# Patient Record
Sex: MALE | Race: WHITE | NOT HISPANIC OR LATINO | Employment: OTHER | URBAN - METROPOLITAN AREA
[De-identification: names, ages, dates, MRNs, and addresses within clinical notes are randomized per-mention and may not be internally consistent; named-entity substitution may affect disease eponyms.]

---

## 2021-02-21 ENCOUNTER — APPOINTMENT (OUTPATIENT)
Dept: GENERAL RADIOLOGY | Facility: HOSPITAL | Age: 66
End: 2021-02-21

## 2021-02-21 ENCOUNTER — HOSPITAL ENCOUNTER (EMERGENCY)
Facility: HOSPITAL | Age: 66
Discharge: HOME | End: 2021-02-21
Attending: EMERGENCY MEDICINE | Admitting: EMERGENCY MEDICINE

## 2021-02-21 VITALS
HEART RATE: 60 BPM | RESPIRATION RATE: 16 BRPM | SYSTOLIC BLOOD PRESSURE: 152 MMHG | DIASTOLIC BLOOD PRESSURE: 83 MMHG | WEIGHT: 185 LBS | TEMPERATURE: 96.4 F | BODY MASS INDEX: 27.4 KG/M2 | OXYGEN SATURATION: 99 % | HEIGHT: 69 IN

## 2021-02-21 DIAGNOSIS — M79.605 LOW BACK PAIN RADIATING TO BOTH LEGS: Primary | ICD-10-CM

## 2021-02-21 DIAGNOSIS — M54.50 LOW BACK PAIN RADIATING TO BOTH LEGS: Primary | ICD-10-CM

## 2021-02-21 DIAGNOSIS — M79.604 LOW BACK PAIN RADIATING TO BOTH LEGS: Primary | ICD-10-CM

## 2021-02-21 PROCEDURE — 96372 THER/PROPH/DIAG INJ SC/IM: CPT

## 2021-02-21 PROCEDURE — 25010000002 KETOROLAC TROMETHAMINE PER 15 MG: Performed by: EMERGENCY MEDICINE

## 2021-02-21 PROCEDURE — 72110 X-RAY EXAM L-2 SPINE 4/>VWS: CPT

## 2021-02-21 PROCEDURE — 99282 EMERGENCY DEPT VISIT SF MDM: CPT

## 2021-02-21 PROCEDURE — 25010000002 HYDROMORPHONE 1 MG/ML SOLUTION: Performed by: EMERGENCY MEDICINE

## 2021-02-21 RX ORDER — METHYLPREDNISOLONE 4 MG/1
TABLET ORAL
Qty: 1 EACH | Refills: 0 | Status: SHIPPED | OUTPATIENT
Start: 2021-02-21

## 2021-02-21 RX ORDER — LISINOPRIL 10 MG/1
30 TABLET ORAL DAILY
COMMUNITY

## 2021-02-21 RX ORDER — KETOROLAC TROMETHAMINE 30 MG/ML
30 INJECTION, SOLUTION INTRAMUSCULAR; INTRAVENOUS ONCE
Status: COMPLETED | OUTPATIENT
Start: 2021-02-21 | End: 2021-02-21

## 2021-02-21 RX ORDER — ORPHENADRINE CITRATE 100 MG/1
100 TABLET, EXTENDED RELEASE ORAL 2 TIMES DAILY
Qty: 20 TABLET | Refills: 0 | Status: SHIPPED | OUTPATIENT
Start: 2021-02-21

## 2021-02-21 RX ADMIN — KETOROLAC TROMETHAMINE 30 MG: 30 INJECTION, SOLUTION INTRAMUSCULAR at 10:13

## 2021-02-21 RX ADMIN — HYDROMORPHONE HYDROCHLORIDE 1 MG: 1 INJECTION, SOLUTION INTRAMUSCULAR; INTRAVENOUS; SUBCUTANEOUS at 10:13

## 2021-02-21 NOTE — ED NOTES
Pt arrived by PV states bilat leg pain. Pt ambulatory to triage desk.     Patient was placed in face mask during first look triage.  Patient was wearing a face mask throughout encounter.  I wore personal protective equipment throughout the encounter.  Hand hygiene was performed before and after patient encounter.        Gretchen Oviedo, RN  02/21/21 0859

## 2021-02-21 NOTE — ED PROVIDER NOTES
EMERGENCY DEPARTMENT ENCOUNTER    Room Number:  03/03  Date seen:  2/21/2021  Time seen: 09:06 EST  PCP: Norbert Keating MD  Historian: patient       HPI:  Chief Complaint: bilateral leg pain    A complete HPI/ROS/PMH/PSH/SH/FH are unobtainable due to: none    Context: Papi Valderrama is a 65 y.o. male who presents to the ED for evaluation of bilateral lower back pain that radiates to the bilateral legs.  He states he chronically has left-sided sciatica and low back pain which he sees pain management for.  To 3 days ago he started having right sided low back pain that radiates to his right leg which feels the same as the left, but is new.  He denies any falls but states he did shovel snow several times this week.  He states it is much more painful when he tries to walk, he is comfortable at rest when sitting or lying.  He denies any lower extremity weakness or numbness, saddle paresthesia, bowel or bladder incontinence or urinary retention.  He also denies any history of IV drug abuse, malignancy or diabetes.  He denies any fever, hematuria dysuria or abdominal pain.  He is ambulatory but with increased pain.  He takes hydrocodone 7.5 mg 4 times daily as well as gabapentin 800 mg 3 times daily as prescribed to him by pain management.  He denies any history of kidney disease and is not anticoagulated.      PAST MEDICAL HISTORY  Active Ambulatory Problems     Diagnosis Date Noted   • No Active Ambulatory Problems     Resolved Ambulatory Problems     Diagnosis Date Noted   • No Resolved Ambulatory Problems     Past Medical History:   Diagnosis Date   • Hyperlipidemia    • Hypertension          PAST SURGICAL HISTORY  Past Surgical History:   Procedure Laterality Date   • BYPASS GRAFT     • CATARACT EXTRACTION     • COLON SURGERY     • COLONOSCOPY           FAMILY HISTORY  History reviewed. No pertinent family history.      SOCIAL HISTORY  Social History     Socioeconomic History   • Marital status: Single      Spouse name: Not on file   • Number of children: Not on file   • Years of education: Not on file   • Highest education level: Not on file   Tobacco Use   • Smoking status: Never Smoker   • Smokeless tobacco: Never Used   Substance and Sexual Activity   • Alcohol use: Yes     Comment: daily 1-2    • Drug use: Never   • Sexual activity: Defer         ALLERGIES  Patient has no known allergies.        REVIEW OF SYSTEMS  Review of Systems     All systems reviewed and negative except for those discussed in HPI.       PHYSICAL EXAM  ED Triage Vitals   Temp Heart Rate Resp BP SpO2   02/21/21 0903 02/21/21 0900 02/21/21 0900 -- 02/21/21 0900   96.4 °F (35.8 °C) 98 18  98 %      Temp src Heart Rate Source Patient Position BP Location FiO2 (%)   02/21/21 0903 02/21/21 0900 -- -- --   Tympanic Monitor            GENERAL: not distressed  HENT: atraumatic  EYES: no scleral icterus  CV: regular rhythm, regular rate  RESPIRATORY: normal effort, ctab  ABDOMEN: soft, nontender  MUSCULOSKELETAL: no deformity.  No midline C, T, L-spine tenderness.  There is no tenderness to the bilateral paraspinal muscles.  He is able to stand walk and change positions on the stretcher.  Straight leg raise is positive on the left, negative on the right. Sensation is intact to light touch throughout the bilateral lower extremities. Muscle strength is 5/5 and symmetrical with plantarflexion and EHL extension. Patellar reflexes are 2+ and equal bilaterally. DP and PT pulses are 2+ bilaterally.   NEURO: alert, moves all extremities, follows commands  SKIN: warm, dry    Vital signs and nursing notes reviewed.        RADIOLOGY  Xr Spine Lumbar Complete 4+vw    Result Date: 2/21/2021  Narrative: FIVE-VIEW LUMBAR SPINE  HISTORY: Low back pain extending into both legs.  There is mild to moderate disc space narrowing and some associated spurring at L5-S1 and to a lesser extent some minimal disc space narrowing and spurring in the remainder of the lumbar spine.  This is associated with some slight spurring of the posterior facets and minimal posterior subluxation at L3-4 measuring 3 or 4 mm.  This report was finalized on 2/21/2021 1:12 PM by Dr. Gagandeep Partida M.D.        I ordered the above noted radiological studies. Reviewed by me and discussed with radiologist.  See dictation for official radiology interpretation.    PROCEDURES  Procedures        MEDICATIONS GIVEN IN ER  Medications   HYDROmorphone (DILAUDID) injection 1 mg (1 mg Intramuscular Given 2/21/21 1013)   ketorolac (TORADOL) injection 30 mg (30 mg Intramuscular Given 2/21/21 1013)             PROGRESS AND CONSULTS    DDX includes but not limited to lumbosacral strain, radiculopathy, sciatica, DJD, spinal stenosis, herniated disc, epidural hematoma, epidural abscess, cauda equina syndrome    ED Course as of Feb 21 1707   Sun Feb 21, 2021   1112 This is the patient, he feels improved.  He ambulated independently with an antalgic gait.  States he has a cane at home that he can use if needed though he has not felt compelled to at this point.  He is already on hydrocodone and gabapentin at home for pain management.  I have encouraged him to call his pain management doctor if he feels he needs any adjustments in those.  For now I will prescribe him a muscle relaxer and a Medrol Dosepak and he can also follow-up with his PCP.  I counseled him on indications for return including leg weakness bowel or bladder dysfunction or saddle paresthesias.  He is agreeable with the plan is stable for discharge.    He has no fever and no IV drug abuse, nothing to suggest epidural abscess, not anticoagulated and no recent procedures on his back to suggest an epidural hematoma.  He has pain but no neurologic deficits to suggest cauda equina or other emergent etiology for his back pain.    [KA]   1115 My interpretation of the lumbar spine films is no acute fracture.    [KA]   1118 Medical chart reviewed in care everywhere.  Patient  sees [facility] for chronic pain.  MRI on 1/13/2016 for low back pain radiating down the left leg for 2 years that showed partial dehydration of all the lumbar disks, mild anterior spurring and mild to moderate diffuse anterior annular bulging at all the lumbar levels.  L3-L4 retrolisthesis and L4-L5 retrolisthesis noted as well as borderline spinal stenosis at L4-L5 and L5-S1.    [KA]      ED Course User Index  [KA] Luna Frye PA        Reviewed pt's history and workup with Dr. Mancera.  After a bedside evaluation; they agree with the plan of care      Patient was placed in face mask in first look. Patient was wearing facemask each time I entered the room and throughout our encounter. I wore protective equipment throughout this patient encounter including a face mask, eye shield and gloves. Hand hygiene was performed before donning protective equipment and after removal when leaving the room.        DIAGNOSIS  Final diagnoses:   Low back pain radiating to both legs               Latest Documented Vital Signs:  As of 17:07 EST  BP- 152/83 HR- 60 Temp- 96.4 °F (35.8 °C) (Tympanic) O2 sat- 99%       Luna Frye PA  02/21/21 1707

## 2021-02-21 NOTE — DISCHARGE INSTRUCTIONS
Gentle activities as tolerated.  Warm moist compresses or cold compresses to the areas of pain, whichever gives you the most relief.  Recheck with your primary care doctor within 2 days.  Return to the emergency department for bowel or bladder incontinence, urinary retention, lower extremity weakness, groin or genital numbness or tingling, any concerns.

## 2021-02-21 NOTE — ED PROVIDER NOTES
MD ATTESTATION NOTE    The JUDY and I have discussed this patient's history, physical exam, and treatment plan.  I have reviewed the documentation and personally had a face to face interaction with the patient. I affirm the documentation and agree with the treatment and plan.  The attached note describes my personal findings.      Papi Valderrama is a 65 y.o. male who presents to the ED c/o bilateral lower extremity pain.  He reports a history of sciatica going down the left side.  He states that over the last few days he has also developed pain going down his right low back into his right leg.  He denies any prior similar episodes in his right leg.  He denies any weakness or numbness.  He denies saddle anesthesia.  He denies urinary or bowel incontinence.  He denies urinary retention.  He states sometimes when he walks the pain increases and causes him to feel like he is going to fall.  He states he takes gabapentin and hydrocodone at home.  He sees pain management.  He states his last MRI was 1 year ago and showed that he had sciatica.      On exam:  GENERAL: Awake, alert, no acute distress  SKIN: Warm, dry  HENT: Normocephalic, atraumatic  EYES: no scleral icterus  CV: regular rhythm, regular rate  RESPIRATORY: normal effort, lungs clear  ABDOMEN: soft, non-tender, non-distended  MUSCULOSKELETAL: no deformity, strong pedal pulses.  Normal sensation and motor in his legs.  NEURO: alert, moves all extremities, follows commands    Labs  No results found for this or any previous visit (from the past 24 hour(s)).    Radiology  Xr Spine Lumbar Complete 4+vw    Result Date: 2/21/2021  FIVE-VIEW LUMBAR SPINE  HISTORY: Low back pain extending into both legs.  There is mild to moderate disc space narrowing and some associated spurring at L5-S1 and to a lesser extent some minimal disc space narrowing and spurring in the remainder of the lumbar spine. This is associated with some slight spurring of the posterior facets and  minimal posterior subluxation at L3-4 measuring 3 or 4 mm.  This report was finalized on 2/21/2021 1:12 PM by Dr. Gagandeep Partida M.D.        Medical Decision Making:  ED Course as of Feb 21 1454   Sun Feb 21, 2021   1112 This is the patient, he feels improved.  He ambulated independently with an antalgic gait.  States he has a cane at home that he can use if needed though he has not felt compelled to at this point.  He is already on hydrocodone and gabapentin at home for pain management.  I have encouraged him to call his pain management doctor if he feels he needs any adjustments in those.  For now I will prescribe him a muscle relaxer and a Medrol Dosepak and he can also follow-up with his PCP.  I counseled him on indications for return including leg weakness bowel or bladder dysfunction or saddle paresthesias.  He is agreeable with the plan is stable for discharge.    He has no fever and no IV drug abuse, nothing to suggest epidural abscess, not anticoagulated and no recent procedures on his back to suggest an epidural hematoma.  He has pain but no neurologic deficits to suggest cauda equina or other emergent etiology for his back pain.    [KA]   1115 My interpretation of the lumbar spine films is no acute fracture.    [KA]   1118 Medical chart reviewed in care everywhere.  Patient sees [facility] for chronic pain.  MRI on 1/13/2016 for low back pain radiating down the left leg for 2 years that showed partial dehydration of all the lumbar disks, mild anterior spurring and mild to moderate diffuse anterior annular bulging at all the lumbar levels.  L3-L4 retrolisthesis and L4-L5 retrolisthesis noted as well as borderline spinal stenosis at L4-L5 and L5-S1.    [KA]      ED Course User Index  [KA] Luna Frye PA       Plan imaging of the spine given his age over 50, no prior records in the system.  Plan symptom control.  He has no concerning signs of cauda equina although development of  bilateral sciatic pain is concerning.    PPE: Both the patient and I wore a surgical mask throughout the entire patient encounter. I wore protective goggles.     Diagnosis  Final diagnoses:   Low back pain radiating to both legs        Timothy Mancera MD  02/21/21 0567